# Patient Record
Sex: FEMALE | Race: BLACK OR AFRICAN AMERICAN | NOT HISPANIC OR LATINO | Employment: FULL TIME | URBAN - METROPOLITAN AREA
[De-identification: names, ages, dates, MRNs, and addresses within clinical notes are randomized per-mention and may not be internally consistent; named-entity substitution may affect disease eponyms.]

---

## 2023-05-02 LAB
CHLAMYDIA TRACH., AMPLIFIED: NEGATIVE
N. GONORRHEA, AMPLIFIED: NEGATIVE
TRICHOMONAS VAGINALIS: POSITIVE

## 2023-10-19 PROBLEM — D06.9 CIN III (CERVICAL INTRAEPITHELIAL NEOPLASIA III): Status: ACTIVE | Noted: 2023-10-19

## 2023-10-19 RX ORDER — NORETHINDRONE ACETATE AND ETHINYL ESTRADIOL 1MG-20(21)
1 KIT ORAL DAILY
COMMUNITY
Start: 2022-02-02 | End: 2023-12-04 | Stop reason: ALTCHOICE

## 2023-10-23 ENCOUNTER — CLINICAL SUPPORT (OUTPATIENT)
Dept: OBSTETRICS AND GYNECOLOGY | Facility: HOSPITAL | Age: 28
End: 2023-10-23

## 2023-10-23 DIAGNOSIS — Z30.42 ENCOUNTER FOR DEPO-PROVERA CONTRACEPTION: Primary | ICD-10-CM

## 2023-10-23 PROCEDURE — 2500000004 HC RX 250 GENERAL PHARMACY W/ HCPCS (ALT 636 FOR OP/ED)

## 2023-10-23 RX ORDER — MEDROXYPROGESTERONE ACETATE 150 MG/ML
150 INJECTION, SUSPENSION INTRAMUSCULAR ONCE
Status: COMPLETED | OUTPATIENT
Start: 2023-10-23 | End: 2023-10-23

## 2023-10-23 RX ADMIN — MEDROXYPROGESTERONE ACETATE 150 MG: 150 INJECTION, SUSPENSION INTRAMUSCULAR at 12:27

## 2023-10-23 NOTE — PROGRESS NOTES
Patient here for depo  Patient last depo 7-31-23  Patient last annual 11-21-22  Patient given depo in right gluteus jered  Patient tolerated injection well  Patient educated on long term depo use  Patient verbalized understanding  Patient given dates to return for next depo injection and annual exam  Depo supplied by adelaida Pickard LPN

## 2023-12-02 NOTE — PROGRESS NOTES
Subjective   Jason Bhatia is a 28 y.o. female who is here for a routine exam.   Concerns: Patient has been having back pain and wants to know if she can be referred to a chiropractor.    Patient has a hx of anxiety and panic attacks. She feels like this is s/p a head trauma last year.    Patient declines COVID.    No LMP recorded.on Depo , not having periods, needs next shot between  and Feb    Last pap: 22 WNL HPV neg   History of abnormal Pap smear: yes -   21 WNL HPV neg   20 WNL HPV neg   19 LEEP TIFFANY II  19 TIFFANY II-III  19 ASCUS HPV +    HPV vaccination: No , would like to start series today     Social History     Substance and Sexual Activity   Sexual Activity Yes    Partners: Male    Birth control/protection: Injection     OB History          4    Para   1    Term   1            AB   3    Living   1         SAB        IAB   2    Ectopic   1    Multiple        Live Births   1               Past Medical History:   Diagnosis Date    Atypical squamous cells of undetermined significance on cytologic smear of cervix (ASC-US) 2021    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    Hx of chlamydia infection     Hx of trichomoniasis      Past Surgical History:   Procedure Laterality Date    SALPINGECTOMY      due to ectopic     Family History   Problem Relation Name Age of Onset    Hypertension Mother      No Known Problems Father      Hypertension Maternal Grandmother         SoHx:  Vaginal hygiene: soap and water   Loss of sexual desire: No  Pain with sex: No  Able to orgasm: Yes   Living Situation: with daughter  Safe at home: Yes   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   Psychiatric/Behavioral:  The patient is nervous/anxious.    All other systems reviewed and are negative.      Objective   /87   Pulse 89   Ht 1.676 m (5' 6\")   Wt 75.4 kg (166 lb 3.2 oz)   BMI 26.83 kg/m²     Physical Exam  Constitutional:       Appearance: Normal appearance.   Cardiovascular:      Rate and Rhythm: Normal rate and regular rhythm.      Heart sounds: Normal heart sounds.   Pulmonary:      Effort: Pulmonary effort is normal.      Breath sounds: Normal breath sounds.   Chest:   Breasts:     Right: Normal.      Left: Normal.   Genitourinary:     General: Normal vulva.      Vagina: Normal.      Cervix: Normal.   Skin:     General: Skin is warm and dry.   Neurological:      Mental Status: She is alert.   Psychiatric:         Mood and Affect: Mood normal.         Behavior: Behavior normal.         Thought Content: Thought content normal.         Judgment: Judgment normal.         Assessment/Plan   Problem List Items Addressed This Visit       Visit for routine gyn exam - Primary    Overview       Pap 12/4/23  Gonorrhea/chlamydia/trichomonas on 12/4  Clinic out of HPV vaccinations on 12/4; encouraged patient to get HPV #1 at next Depo shot   Depo for birth control          Relevant Orders    Flu vaccine, high dose seasonal, preservative free (Completed)     Other Visit Diagnoses       Screening for malignant neoplasm of cervix        Relevant Orders    THINPREP PAP    Routine screening for STI (sexually transmitted infection)        Relevant Orders    Referral to Psychology    POC Urine Dip (Completed)    Anxiety        Relevant Orders    Referral to Psychology            Plan:   -discussed about breast self awareness  -reviewed mammogram starting at 40 years old " unless otherwise clinically indicated   -encouraged healthy eating, exercise recommendations based of off  min of moderate intensity exercise a week  -RTC in 1  year for next annual or prn    IRINA Bah

## 2023-12-04 ENCOUNTER — OFFICE VISIT (OUTPATIENT)
Dept: OBSTETRICS AND GYNECOLOGY | Facility: CLINIC | Age: 28
End: 2023-12-04
Payer: COMMERCIAL

## 2023-12-04 VITALS
WEIGHT: 166.2 LBS | DIASTOLIC BLOOD PRESSURE: 87 MMHG | HEIGHT: 66 IN | SYSTOLIC BLOOD PRESSURE: 142 MMHG | BODY MASS INDEX: 26.71 KG/M2 | HEART RATE: 89 BPM

## 2023-12-04 DIAGNOSIS — Z12.4 SCREENING FOR MALIGNANT NEOPLASM OF CERVIX: ICD-10-CM

## 2023-12-04 DIAGNOSIS — F41.9 ANXIETY: ICD-10-CM

## 2023-12-04 DIAGNOSIS — Z01.419 ENCOUNTER FOR GYNECOLOGICAL EXAMINATION WITHOUT ABNORMAL FINDING: Primary | ICD-10-CM

## 2023-12-04 DIAGNOSIS — Z11.3 ROUTINE SCREENING FOR STI (SEXUALLY TRANSMITTED INFECTION): ICD-10-CM

## 2023-12-04 LAB
POC APPEARANCE, URINE: CLEAR
POC BILIRUBIN, URINE: NEGATIVE
POC BLOOD, URINE: ABNORMAL
POC COLOR, URINE: YELLOW
POC GLUCOSE, URINE: NEGATIVE MG/DL
POC KETONES, URINE: NEGATIVE MG/DL
POC LEUKOCYTES, URINE: NEGATIVE
POC NITRITE,URINE: NEGATIVE
POC PH, URINE: 6.5 PH
POC PROTEIN, URINE: NEGATIVE MG/DL
POC SPECIFIC GRAVITY, URINE: >=1.03
POC UROBILINOGEN, URINE: 0.2 EU/DL

## 2023-12-04 PROCEDURE — 90662 IIV NO PRSV INCREASED AG IM: CPT | Performed by: ADVANCED PRACTICE MIDWIFE

## 2023-12-04 PROCEDURE — 99395 PREV VISIT EST AGE 18-39: CPT | Performed by: ADVANCED PRACTICE MIDWIFE

## 2023-12-04 PROCEDURE — 99395 PREV VISIT EST AGE 18-39: CPT | Mod: 25 | Performed by: ADVANCED PRACTICE MIDWIFE

## 2023-12-04 PROCEDURE — 87661 TRICHOMONAS VAGINALIS AMPLIF: CPT | Mod: 59 | Performed by: ADVANCED PRACTICE MIDWIFE

## 2023-12-04 PROCEDURE — 87800 DETECT AGNT MULT DNA DIREC: CPT | Performed by: ADVANCED PRACTICE MIDWIFE

## 2023-12-04 PROCEDURE — 87624 HPV HI-RISK TYP POOLED RSLT: CPT | Performed by: ADVANCED PRACTICE MIDWIFE

## 2023-12-04 PROCEDURE — 88175 CYTOPATH C/V AUTO FLUID REDO: CPT | Mod: TC | Performed by: ADVANCED PRACTICE MIDWIFE

## 2023-12-04 PROCEDURE — 81003 URINALYSIS AUTO W/O SCOPE: CPT | Performed by: ADVANCED PRACTICE MIDWIFE

## 2023-12-04 RX ORDER — MEDROXYPROGESTERONE ACETATE 104 MG/.65ML
104 INJECTION, SUSPENSION SUBCUTANEOUS
COMMUNITY

## 2023-12-04 ASSESSMENT — PAIN SCALES - GENERAL: PAINLEVEL: 0-NO PAIN

## 2023-12-04 ASSESSMENT — ENCOUNTER SYMPTOMS
PSYCHIATRIC NEGATIVE: 0
NERVOUS/ANXIOUS: 1
CONSTITUTIONAL NEGATIVE: 0
NEUROLOGICAL NEGATIVE: 0
GASTROINTESTINAL NEGATIVE: 0
CARDIOVASCULAR NEGATIVE: 0
BACK PAIN: 1
ENDOCRINE NEGATIVE: 0
ALLERGIC/IMMUNOLOGIC NEGATIVE: 0
HEMATOLOGIC/LYMPHATIC NEGATIVE: 0
EYES NEGATIVE: 0
RESPIRATORY NEGATIVE: 0
MUSCULOSKELETAL NEGATIVE: 0

## 2023-12-06 LAB
C TRACH RRNA SPEC QL NAA+PROBE: NEGATIVE
N GONORRHOEA DNA SPEC QL PROBE+SIG AMP: NEGATIVE
T VAGINALIS RRNA SPEC QL NAA+PROBE: NEGATIVE

## 2023-12-26 LAB
CYTOLOGY CMNT CVX/VAG CYTO-IMP: NORMAL
HPV HR 12 DNA GENITAL QL NAA+PROBE: NEGATIVE
HPV HR GENOTYPES PNL CVX NAA+PROBE: NEGATIVE
HPV16 DNA SPEC QL NAA+PROBE: NEGATIVE
HPV18 DNA SPEC QL NAA+PROBE: NEGATIVE
LAB AP HPV GENOTYPE QUESTION: YES
LAB AP HPV HR: NORMAL
LAB AP PAP ADDITIONAL TESTS: NORMAL
LAB AP PREVIOUS ABNORMAL HISTORY: NORMAL
LABORATORY COMMENT REPORT: NORMAL
PATH REPORT.TOTAL CANCER: NORMAL

## 2024-02-19 ENCOUNTER — CLINICAL SUPPORT (OUTPATIENT)
Dept: OBSTETRICS AND GYNECOLOGY | Facility: CLINIC | Age: 29
End: 2024-02-19

## 2024-02-19 DIAGNOSIS — Z30.42 ENCOUNTER FOR DEPO-PROVERA CONTRACEPTION: Primary | ICD-10-CM

## 2024-02-19 LAB — PREGNANCY TEST URINE, POC: NEGATIVE

## 2024-02-19 PROCEDURE — 96372 THER/PROPH/DIAG INJ SC/IM: CPT | Performed by: OBSTETRICS & GYNECOLOGY

## 2024-02-19 PROCEDURE — 81025 URINE PREGNANCY TEST: CPT

## 2024-02-19 PROCEDURE — 81025 URINE PREGNANCY TEST: CPT | Performed by: OBSTETRICS & GYNECOLOGY

## 2024-02-19 PROCEDURE — 2500000004 HC RX 250 GENERAL PHARMACY W/ HCPCS (ALT 636 FOR OP/ED): Performed by: OBSTETRICS & GYNECOLOGY

## 2024-02-19 RX ORDER — MEDROXYPROGESTERONE ACETATE 150 MG/ML
150 INJECTION, SUSPENSION INTRAMUSCULAR ONCE
Status: COMPLETED | OUTPATIENT
Start: 2024-02-19 | End: 2024-02-19

## 2024-02-19 RX ADMIN — MEDROXYPROGESTERONE ACETATE 150 MG: 150 INJECTION, SUSPENSION INTRAMUSCULAR at 11:57

## 2024-02-19 NOTE — PROGRESS NOTES
Patient here for Depoprovera, last injection 10/23/2023  Discussed side effects denied   Return to clinic card given for  5/7/2024-5/21/2024  Annual exam due 12/2026     Depoprovera 150mg intramuscular given.  Tolerated well

## 2024-05-07 ENCOUNTER — CLINICAL SUPPORT (OUTPATIENT)
Dept: OBSTETRICS AND GYNECOLOGY | Facility: CLINIC | Age: 29
End: 2024-05-07

## 2024-05-07 DIAGNOSIS — Z30.42 ENCOUNTER FOR DEPO-PROVERA CONTRACEPTION: ICD-10-CM

## 2024-05-07 PROCEDURE — 96372 THER/PROPH/DIAG INJ SC/IM: CPT | Performed by: ADVANCED PRACTICE MIDWIFE

## 2024-05-07 PROCEDURE — 2500000004 HC RX 250 GENERAL PHARMACY W/ HCPCS (ALT 636 FOR OP/ED): Performed by: ADVANCED PRACTICE MIDWIFE

## 2024-05-07 RX ORDER — MEDROXYPROGESTERONE ACETATE 150 MG/ML
150 INJECTION, SUSPENSION INTRAMUSCULAR
Status: SHIPPED | OUTPATIENT
Start: 2024-05-07 | End: 2025-01-14

## 2024-05-07 RX ADMIN — MEDROXYPROGESTERONE ACETATE 150 MG: 150 INJECTION, SUSPENSION INTRAMUSCULAR at 11:59

## 2024-05-07 NOTE — PROGRESS NOTES
Patient presents today for every 12 week Depo Provera injection for prevention of pregnancy. Patient last dose was 2/19. Medication injected into right gluteal muscle. No adverse effects. Hghlighted calendar given to patient with next dose dates. Encouraged patient to schedule next dose before leaving office.

## 2024-09-11 ENCOUNTER — APPOINTMENT (OUTPATIENT)
Dept: SURGERY | Facility: CLINIC | Age: 29
End: 2024-09-11
Payer: COMMERCIAL